# Patient Record
Sex: FEMALE | Race: OTHER | URBAN - METROPOLITAN AREA
[De-identification: names, ages, dates, MRNs, and addresses within clinical notes are randomized per-mention and may not be internally consistent; named-entity substitution may affect disease eponyms.]

---

## 2020-11-25 NOTE — PATIENT DISCUSSION
The patient has undergone maximal medical therapy with artificial tears and is still with signs and symptoms of ocular surface disease / dry eye syndrome. After risks, benefits, and alternatives were discussed, the patient would like to proceed with punctual plugging of bilateral lower puncti. (Oasis 0.6 OD and 0.5 OS).

## 2020-11-25 NOTE — PROCEDURE NOTE: CLINICAL
PROCEDURE NOTE: Punctal Plugs, Silicone OU. Diagnosis: Dry Eye Syndrome. Anesthesia: Topical. Prep: Betadine Flush. Prior to treatment, the risks/benefits/alternatives were discussed. The patient wished to proceed with procedure. Permanent silicone plugs were inserted. Size/location of plugs inserted: BLL / 0.5 & 0.6. Patient tolerated procedure well. There were no complications. Post procedure instructions given. Delmy Casillas

## 2021-01-25 NOTE — PATIENT DISCUSSION
Cataract surgery has been performed in the first eye and activities of daily living are still impaired. The patient would like to proceed with cataract surgery in the second eye as scheduled. The patient elects BASIC OD, goal of eddie.

## 2021-05-14 NOTE — PATIENT DISCUSSION
"Subjective:       Patient ID: May Tomlinson is a 57 y.o. female.    Vitals:  height is 5' 5" (1.651 m) and weight is 72.6 kg (160 lb). Her temperature is 98.9 °F (37.2 °C). Her blood pressure is 99/56 (abnormal) and her pulse is 67. Her respiration is 20 and oxygen saturation is 98%.     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  She experienced pain in her low back and upper back.  The low back pain has resolved.  However she is complaining of right upper back pain today.  No fever or shortness of breath.  No weakness.  She took 1 Aleve this morning, although nothing else.      Back Pain   This is a new problem. Episode onset: monday. The problem occurs constantly. The problem is unchanged. The quality of the pain is described as aching. The pain is at a severity of 8/10. The pain is moderate. The pain is the same all the time. The symptoms are aggravated by lying down, sitting, standing, twisting and bending. Stiffness is present in the morning. Pertinent negatives include no chest pain, dysuria or weakness. She has tried NSAIDs for the symptoms. The treatment provided mild relief.       Constitution: Negative for chills and fatigue.   HENT: Negative for congestion and sore throat.    Neck: Negative for painful lymph nodes.   Cardiovascular: Negative for chest pain and leg swelling.   Eyes: Negative for double vision and blurred vision.   Respiratory: Negative for cough and shortness of breath.    Gastrointestinal: Negative for nausea, vomiting and diarrhea.   Genitourinary: Negative for dysuria, frequency, urgency and history of kidney stones.   Musculoskeletal: Positive for joint pain and back pain. Negative for joint swelling, muscle cramps and muscle ache.   Skin: Negative for color change, pale, rash and bruising.   Allergic/Immunologic: Negative for " Patient understands condition, prognosis and need for follow up care. seasonal allergies.   Neurological: Negative for dizziness, history of vertigo, light-headedness and passing out.   Hematologic/Lymphatic: Negative for swollen lymph nodes.   Psychiatric/Behavioral: Negative for nervous/anxious, sleep disturbance and depression. The patient is not nervous/anxious.        Objective:      Physical Exam   Constitutional: She is oriented to person, place, and time. Vital signs are normal. She appears well-developed and well-nourished. She is active and cooperative. No distress.   HENT:   Head: Normocephalic and atraumatic.   Nose: Nose normal.   Mouth/Throat: Oropharynx is clear and moist and mucous membranes are normal.   Eyes: Conjunctivae and lids are normal.   Neck: Trachea normal, normal range of motion, full passive range of motion without pain and phonation normal. Neck supple.   Cardiovascular: Normal rate, regular rhythm, normal heart sounds, intact distal pulses and normal pulses.   Pulmonary/Chest: Effort normal and breath sounds normal. No stridor. No respiratory distress. She has no wheezes. She has no rales.   Abdominal: Normal appearance. She exhibits no abdominal bruit and no pulsatile midline mass.   Musculoskeletal: She exhibits no edema or deformity.   Normal gait.  Can heel walk and toe walk.  Waist flexion to 90°.  Nontender over vertebral spine.  There is some right paracervical muscular tenderness with noted spasm that radiates down to traps.  Motor and sensory exam is within normal limits.  DTRs 3+ and equal.   Lymphadenopathy:     She has no cervical adenopathy.   Neurological: She is alert and oriented to person, place, and time. She has normal strength and normal reflexes. No sensory deficit.   Skin: Skin is warm, dry and intact. She is not diaphoretic.   Psychiatric: She has a normal mood and affect. Her speech is normal and behavior is normal. Judgment and thought content normal. Cognition and memory are normal.   Nursing note and vitals reviewed.       Assessment:       1. Upper back strain, initial encounter        Plan:         Upper back strain, initial encounter    TRY IBUPROFEN 400-600 MG 3-4 TIMES DAILY WITH FOOD.    BE AWARE THAT THE MUSCLE RELAXANT, FLEXERIL, CAN MAKE YOU SLEEPY, SO DO NOT TAKE BEFORE DRIVING OR OPERATING HEAVY MACHINERY.    AVOID AGGRAVATING ACTIVITIES.    Make sure that you follow up with your primary care doctor in the next 2-5 days if needed .  Return to the Urgent Care if signs or symptoms change and certainly if you have worsening symptoms go to the nearest emergency department for further evaluation.

## 2022-02-09 ENCOUNTER — NEW PATIENT (OUTPATIENT)
Dept: URBAN - METROPOLITAN AREA CLINIC 17 | Facility: CLINIC | Age: 62
End: 2022-02-09

## 2022-02-09 DIAGNOSIS — H25.13: ICD-10-CM

## 2022-02-09 DIAGNOSIS — H04.123: ICD-10-CM

## 2022-02-09 DIAGNOSIS — Z98.890: ICD-10-CM

## 2022-02-09 PROCEDURE — 92004 COMPRE OPH EXAM NEW PT 1/>: CPT

## 2022-02-09 PROCEDURE — 92310A CONTACT LENS 50

## 2022-02-09 PROCEDURE — 92015 DETERMINE REFRACTIVE STATE: CPT

## 2022-02-09 ASSESSMENT — VISUAL ACUITY
OD_CC: 20/25
OS_CC: J1
OS_CC: 20/30-1
OD_CC: J1

## 2022-02-09 ASSESSMENT — TONOMETRY
OS_IOP_MMHG: 10
OD_IOP_MMHG: 11

## 2022-12-12 NOTE — PROCEDURE NOTE: CLINICAL
PROCEDURE NOTE: Punctal Plugs, Silicone #1 Right Lower Lid. Anesthesia: Proparacaine 0.5%. Prior to treatment, the risks/benefits/alternatives were discussed. The patient wished to proceed with procedure. 1 drop of Proparacaine 0.5% was instilled in eye. 0.* mm permanent silicone plugs were inserted in * eyelids. Lot # * and exp *  Patient tolerated procedure well. There were no complications. Post procedure instructions given. Emma Meredith PROCEDURE NOTE: Punctal Plugs, Silicone #1 Left Lower Lid. Anesthesia: Proparacaine 0.5%. Prior to treatment, the risks/benefits/alternatives were discussed. The patient wished to proceed with procedure. 1 drop of Proparacaine 0.5% was instilled in eye. 0.* mm permanent silicone plugs were inserted in * eyelids. Lot # * and exp *  Patient tolerated procedure well. There were no complications. Post procedure instructions given. Emma Meredith

## 2023-12-06 ENCOUNTER — ESTABLISHED PATIENT (OUTPATIENT)
Dept: URBAN - METROPOLITAN AREA CLINIC 17 | Facility: CLINIC | Age: 63
End: 2023-12-06

## 2023-12-06 DIAGNOSIS — Z98.890: ICD-10-CM

## 2023-12-06 DIAGNOSIS — H04.123: ICD-10-CM

## 2023-12-06 DIAGNOSIS — H25.13: ICD-10-CM

## 2023-12-06 PROCEDURE — 92015 DETERMINE REFRACTIVE STATE: CPT

## 2023-12-06 PROCEDURE — 92310A CONTACT LENS 50

## 2023-12-06 PROCEDURE — 92014 COMPRE OPH EXAM EST PT 1/>: CPT

## 2023-12-06 ASSESSMENT — TONOMETRY
OS_IOP_MMHG: 16
OD_IOP_MMHG: 16

## 2023-12-06 ASSESSMENT — VISUAL ACUITY
OS_CC: 20/25
OD_CC: 20/30
OU_CC: J3

## 2024-12-09 ENCOUNTER — COMPREHENSIVE EXAM (OUTPATIENT)
Age: 64
End: 2024-12-09

## 2024-12-09 DIAGNOSIS — H52.4: ICD-10-CM

## 2024-12-09 DIAGNOSIS — H04.123: ICD-10-CM

## 2024-12-09 DIAGNOSIS — H25.13: ICD-10-CM

## 2024-12-09 PROCEDURE — 92014 COMPRE OPH EXAM EST PT 1/>: CPT

## 2024-12-09 PROCEDURE — 92310-1 LEVEL 1 SOFT LENS UPDATE
